# Patient Record
Sex: MALE | Race: WHITE | Employment: FULL TIME | URBAN - METROPOLITAN AREA
[De-identification: names, ages, dates, MRNs, and addresses within clinical notes are randomized per-mention and may not be internally consistent; named-entity substitution may affect disease eponyms.]

---

## 2019-06-18 RX ORDER — ESCITALOPRAM OXALATE 10 MG/1
10 TABLET ORAL DAILY
COMMUNITY

## 2019-06-18 RX ORDER — DEXTROAMPHETAMINE SACCHARATE, AMPHETAMINE ASPARTATE MONOHYDRATE, DEXTROAMPHETAMINE SULFATE AND AMPHETAMINE SULFATE 5; 5; 5; 5 MG/1; MG/1; MG/1; MG/1
20 CAPSULE, EXTENDED RELEASE ORAL
COMMUNITY

## 2019-06-18 NOTE — PERIOP NOTES
PAT - SURGICAL PRE-ADMISSION INSTRUCTIONS    NAME:  Mac Paiz                                                          TODAY'S DATE:  6/18/2019    SURGERY DATE:  6/20/2019                                  SURGERY ARRIVAL TIME:   0800    1. Do NOT eat or drink anything, including candy or gum, after MIDNIGHT on 06/19 , unless you have specific instructions from your Surgeon or Anesthesia Provider to do so. 2. No smoking on the day of surgery. 3. No alcohol 24 hours prior to the day of surgery. 4. No recreational drugs for one week prior to the day of surgery. 5. Leave all valuables, including money/purse, at home. 6. Remove all jewelry, nail polish, makeup (including mascara); no lotions, powders, deodorant, or perfume/cologne/after shave. 7. Glasses/Contact lenses and Dentures may be worn to the hospital.  They will be removed prior to surgery. 8. Call your doctor if symptoms of a cold or illness develop within 24 ours prior to surgery. 9. AN ADULT MUST DRIVE YOU HOME AFTER OUTPATIENT SURGERY. 10. If you are having an OUTPATIENT procedure, please make arrangements for a responsible adult to be with you for 24 hours after your surgery. 11. If you are admitted to the hospital, you will be assigned to a bed after surgery is complete. Normally a family member will not be able to see you until you are in your assigned bed. 15. Family is encouraged to accompany you to the hospital.  We ask visitors in the treatment area to be limited to ONE person at a time to ensure patient privacy. EXCEPTIONS WILL BE MADE AS NEEDED. 15. Children under 12 are discouraged from entering the treatment area and need to be supervised by an adult when in the waiting room. Special Instructions:    Complete bowel prep per MD instructions. Patient Prep:    shower with anti-bacterial soap    These surgical instructions were reviewed with Jordan during the PAT phone call. Directions:   On the morning of surgery, please go to the [address] [place] [address]. Enter the building from the [facility] entrance, 1st floor (next to the Emergency Room entrance). Take the elevator to the 2nd floor. Sign in at the Registration Desk.     If you have any questions and/or concerns, please do not hesitate to call:  (Prior to the day of surgery)  [facility] unit:  [phone]  (Day of surgery)  [facility] unit:  [phone]

## 2019-06-19 ENCOUNTER — ANESTHESIA EVENT (OUTPATIENT)
Dept: ENDOSCOPY | Age: 44
End: 2019-06-19
Payer: COMMERCIAL

## 2019-06-20 ENCOUNTER — HOSPITAL ENCOUNTER (OUTPATIENT)
Age: 44
Setting detail: OUTPATIENT SURGERY
Discharge: HOME | End: 2019-06-20
Attending: INTERNAL MEDICINE | Admitting: INTERNAL MEDICINE
Payer: COMMERCIAL

## 2019-06-20 ENCOUNTER — ANESTHESIA (OUTPATIENT)
Dept: ENDOSCOPY | Age: 44
End: 2019-06-20
Payer: COMMERCIAL

## 2019-06-20 VITALS
OXYGEN SATURATION: 99 % | WEIGHT: 193.25 LBS | BODY MASS INDEX: 24.8 KG/M2 | RESPIRATION RATE: 16 BRPM | HEART RATE: 56 BPM | DIASTOLIC BLOOD PRESSURE: 78 MMHG | TEMPERATURE: 98.2 F | HEIGHT: 74 IN | SYSTOLIC BLOOD PRESSURE: 130 MMHG

## 2019-06-20 PROCEDURE — 76040000007: Performed by: INTERNAL MEDICINE

## 2019-06-20 PROCEDURE — 77030007289 HC DEV ROTH NET RETRV STRC -B: Performed by: INTERNAL MEDICINE

## 2019-06-20 PROCEDURE — 77030034690 HC DEV ENDO SNR RETRV STRC -B: Performed by: INTERNAL MEDICINE

## 2019-06-20 PROCEDURE — 74011250636 HC RX REV CODE- 250/636: Performed by: INTERNAL MEDICINE

## 2019-06-20 PROCEDURE — 77030003657 HC NDL SCLER BSC -B: Performed by: INTERNAL MEDICINE

## 2019-06-20 PROCEDURE — 77030029384 HC SNR POLYP CAPTVR II BSC -B: Performed by: INTERNAL MEDICINE

## 2019-06-20 PROCEDURE — 77030011223 HC DEV LIG POLYLP OCOA -B: Performed by: INTERNAL MEDICINE

## 2019-06-20 PROCEDURE — 74011250636 HC RX REV CODE- 250/636: Performed by: NURSE ANESTHETIST, CERTIFIED REGISTERED

## 2019-06-20 PROCEDURE — 76060000032 HC ANESTHESIA 0.5 TO 1 HR: Performed by: INTERNAL MEDICINE

## 2019-06-20 PROCEDURE — 77030010936 HC CLP LIG BSC -C: Performed by: INTERNAL MEDICINE

## 2019-06-20 PROCEDURE — 74011250636 HC RX REV CODE- 250/636

## 2019-06-20 PROCEDURE — 77030020018 HC MRKR ENDOSC SPOT 5ML SYR GISP -B: Performed by: INTERNAL MEDICINE

## 2019-06-20 PROCEDURE — 88305 TISSUE EXAM BY PATHOLOGIST: CPT

## 2019-06-20 RX ORDER — SODIUM CHLORIDE 0.9 % (FLUSH) 0.9 %
5-40 SYRINGE (ML) INJECTION EVERY 8 HOURS
Status: DISCONTINUED | OUTPATIENT
Start: 2019-06-20 | End: 2019-06-20 | Stop reason: HOSPADM

## 2019-06-20 RX ORDER — LIDOCAINE HYDROCHLORIDE 20 MG/ML
INJECTION, SOLUTION EPIDURAL; INFILTRATION; INTRACAUDAL; PERINEURAL AS NEEDED
Status: DISCONTINUED | OUTPATIENT
Start: 2019-06-20 | End: 2019-06-20 | Stop reason: HOSPADM

## 2019-06-20 RX ORDER — EPINEPHRINE 0.1 MG/ML
INJECTION INTRACARDIAC; INTRAVENOUS AS NEEDED
Status: DISCONTINUED | OUTPATIENT
Start: 2019-06-20 | End: 2019-06-20 | Stop reason: HOSPADM

## 2019-06-20 RX ORDER — FAMOTIDINE 20 MG/1
20 TABLET, FILM COATED ORAL ONCE
Status: DISCONTINUED | OUTPATIENT
Start: 2019-06-20 | End: 2019-06-20 | Stop reason: HOSPADM

## 2019-06-20 RX ORDER — DEXTROMETHORPHAN/PSEUDOEPHED 2.5-7.5/.8
1.2 DROPS ORAL
Status: DISCONTINUED | OUTPATIENT
Start: 2019-06-20 | End: 2019-06-20 | Stop reason: HOSPADM

## 2019-06-20 RX ORDER — SODIUM CHLORIDE 0.9 % (FLUSH) 0.9 %
5-40 SYRINGE (ML) INJECTION AS NEEDED
Status: DISCONTINUED | OUTPATIENT
Start: 2019-06-20 | End: 2019-06-20 | Stop reason: HOSPADM

## 2019-06-20 RX ORDER — SODIUM CHLORIDE, SODIUM LACTATE, POTASSIUM CHLORIDE, CALCIUM CHLORIDE 600; 310; 30; 20 MG/100ML; MG/100ML; MG/100ML; MG/100ML
25 INJECTION, SOLUTION INTRAVENOUS CONTINUOUS
Status: DISCONTINUED | OUTPATIENT
Start: 2019-06-20 | End: 2019-06-20 | Stop reason: HOSPADM

## 2019-06-20 RX ORDER — PROPOFOL 10 MG/ML
INJECTION, EMULSION INTRAVENOUS AS NEEDED
Status: DISCONTINUED | OUTPATIENT
Start: 2019-06-20 | End: 2019-06-20 | Stop reason: HOSPADM

## 2019-06-20 RX ADMIN — PROPOFOL 50 MG: 10 INJECTION, EMULSION INTRAVENOUS at 09:41

## 2019-06-20 RX ADMIN — PROPOFOL 50 MG: 10 INJECTION, EMULSION INTRAVENOUS at 09:35

## 2019-06-20 RX ADMIN — PROPOFOL 100 MG: 10 INJECTION, EMULSION INTRAVENOUS at 09:53

## 2019-06-20 RX ADMIN — PROPOFOL 50 MG: 10 INJECTION, EMULSION INTRAVENOUS at 09:46

## 2019-06-20 RX ADMIN — SODIUM CHLORIDE, SODIUM LACTATE, POTASSIUM CHLORIDE, AND CALCIUM CHLORIDE 25 ML/HR: 600; 310; 30; 20 INJECTION, SOLUTION INTRAVENOUS at 09:15

## 2019-06-20 RX ADMIN — PROPOFOL 50 MG: 10 INJECTION, EMULSION INTRAVENOUS at 09:31

## 2019-06-20 RX ADMIN — PROPOFOL 50 MG: 10 INJECTION, EMULSION INTRAVENOUS at 09:37

## 2019-06-20 RX ADMIN — PROPOFOL 50 MG: 10 INJECTION, EMULSION INTRAVENOUS at 09:39

## 2019-06-20 RX ADMIN — PROPOFOL 100 MG: 10 INJECTION, EMULSION INTRAVENOUS at 09:29

## 2019-06-20 RX ADMIN — PROPOFOL 50 MG: 10 INJECTION, EMULSION INTRAVENOUS at 09:43

## 2019-06-20 RX ADMIN — PROPOFOL 50 MG: 10 INJECTION, EMULSION INTRAVENOUS at 09:32

## 2019-06-20 RX ADMIN — LIDOCAINE HYDROCHLORIDE 40 MG: 20 INJECTION, SOLUTION EPIDURAL; INFILTRATION; INTRACAUDAL; PERINEURAL at 09:29

## 2019-06-20 NOTE — PERIOP NOTES
Pre-Op Summary    Pt arrived via car with family/friend and is oriented to time, place, person and situation. Patient with steady gait with none assistive devices. Visit Vitals  /79   Pulse 60   Temp 98.2 °F (36.8 °C)   Resp 16   Ht 6' 2\" (1.88 m)   Wt 87.7 kg (193 lb 4 oz)   SpO2 98%   BMI 24.81 kg/m²       Peripheral IV located on Right hand . Patients belongings are located with wife. Patient's point of contact is Emma Greco, wife and their contact number is: NA. They will be in the waiting room. They are able to receive medication information. They will be their ride home.

## 2019-06-20 NOTE — ROUTINE PROCESS
Patient taken to recovery by this RN and CRNA, bedside report given to [facility] RN. Patient stable and on monitor.

## 2019-06-20 NOTE — PERIOP NOTES
Phase 2 Recovery Summary  Patient arrived to Phase 2 at 1004  Report received from Carlos Chase:    06/18/19 1219 06/20/19 0856 06/20/19 1005   BP:  126/79 130/78   Pulse:  60 (!) 56   Resp:  16 16   Temp:  98.2 °F (36.8 °C)    SpO2:  98% 99%   Weight: 90.7 kg (200 lb) 87.7 kg (193 lb 4 oz)    Height: 6' 2\" (1.88 m)         oriented to time, place, person and situation    Lines and Drains  Peripheral Intravenous Line:  Removed       Patient arrived to phase 2 from [facility]. Alert and oriented x4. No complaints of pain, nausea or dizziness. Vital signs taken. Patient ambulated from bed to chair with minimal assistance. family brought back to recovery room. Dr. Azael Forbes came and spoke with patient and family. Tolerated fluids well. IV removed and patient allowed to get dressed. Reviewed discharge instructions. Wife signed for discharge. Patient transported to vehicle via wheelchair.        Patient discharged to home with Wife, Raye Ormond  at [place] [address]

## 2019-06-20 NOTE — PROCEDURES
Colonoscopy Report    Patient: Irma Miranda MRN: [id number]  SSN: [id number]    YOB: 1975  Age: 53 y.o. Sex: male      Date of Procedure: 6/20/2019    IMPRESSION:  1. Large 3-4 cm pedunculated polyp observed in the sigmoid colon. Friable. Status post injected with 2 cc of epinephrine to the pedicle. Proceeded to hot snare the polyp piecemeal down to the pedicle. No bleeding observed. An endoclip was applied to the mucosal defect. Tattoo was applied to the base. Net was used to extract the polyp. RECOMMENDATIONS:  1. Resume regular diet, Recommend high fiber. 2. Repeat Flexible sigmoidoscopy in 3 months to ensure no recurrence of polyp. Indication:  Large Colon Polyp  Procedure Performed: Colonoscopy polypectomy (snare cautery)  Endoscopist: Narda Gonzalez MD  Assistant: Endoscopy Technician-1: Franny Live  Endoscopy RN-1: Erma Hammond RN  Endoscopy RN-2: Dakota Campos RN  ASA: ASA 2 - Patient with mild systemic disease with no functional limitations  Mallampati Score: I (soft palate, uvula, fauces, tonsillar pillars visible)  Anesthesia: MAC anesthesia Propofol  Endoscope:     []  CF H 190AL   []  PCF H190AL   [x]  GIF H 190    Extent of Examination:sigmoid colon  Quality of Preparation:     [x]  Excellent   []  Very Good   [] Fair but adequate   [] Fair, inadequate   []  Poor      Technique: The procedure was discussed with the patient including risks, benefits, alternatives including risks of IV sedation, bleeding, perforation and missed polyp. A safety timeout was performed. The patient was given incremental doses of intravenous sedation to achieve moderate sedation. The patients vital signs were monitored at all times including heart rate, rhythm, blood pressure and oxygen saturation. The patient was placed in left lateral position. When adequate sedation was achieved a perianal inspection and a digital rectal exam were performed.  Video colonoscope was introduced into the rectum and advanced under direct vision up to the sigmoid colon. The cecum was identified by IC valve, appendiceal orifice and crows foot. With adequate insufflation and maneuvering of the withdrawing scope, the colonic mucosa was visualized carefully. Retroflexion was performed in the rectum and the distal rectum visualized. The patient tolerated the procedure very well and was transferred to recovery area. Findings:  1. Large 3-4 cm pedunculated polyp observed in the sigmoid colon. Friable. Status post injected with 2 cc of epinephrine to the pedicle. Proceeded to hot snare the polyp piecemeal down to the pedicle. No bleeding observed. An endoclip was applied to the mucosal defect. Tattoo was applied to the base. Net was used to extract the polyp.       EBL:Minimal  Specimen:   ID Type Source Tests Collected by Time Destination   1 : hot snare polyp  Preservative Sigmoid  Orval MD Brett 6/20/2019 [id number] Pathology       Winter Persaud MD  June 20, 2019  10:06 AM

## 2019-06-20 NOTE — ANESTHESIA PREPROCEDURE EVALUATION
Relevant Problems   No relevant active problems       Anesthetic History   No history of anesthetic complications            Review of Systems / Medical History  Patient summary reviewed and pertinent labs reviewed    Pulmonary  Within defined limits                 Neuro/Psych   Within defined limits           Cardiovascular  Within defined limits                Exercise tolerance: >4 METS     GI/Hepatic/Renal  Within defined limits              Endo/Other  Within defined limits           Other Findings   Comments:                  Physical Exam    Airway  Mallampati: II  TM Distance: 4 - 6 cm  Neck ROM: normal range of motion   Mouth opening: Normal     Cardiovascular  Regular rate and rhythm,  S1 and S2 normal,  no murmur, click, rub, or gallop  Rhythm: regular  Rate: normal         Dental  No notable dental hx       Pulmonary  Breath sounds clear to auscultation               Abdominal  GI exam deferred       Other Findings            Anesthetic Plan    ASA: 1  Anesthesia type: MAC          Induction: Intravenous  Anesthetic plan and risks discussed with: Patient

## 2019-06-20 NOTE — ANESTHESIA POSTPROCEDURE EVALUATION
Procedure(s):  SIGMOIDOSCOPY FLEXIBLE.     MAC    Anesthesia Post Evaluation      Multimodal analgesia: multimodal analgesia not used between 6 hours prior to anesthesia start to PACU discharge  Patient location during evaluation: bedside  Patient participation: complete - patient participated  Level of consciousness: awake and alert  Pain management: satisfactory to patient  Airway patency: patent  Anesthetic complications: no  Cardiovascular status: acceptable  Respiratory status: acceptable  Hydration status: acceptable  Post anesthesia nausea and vomiting:  none      Vitals Value Taken Time   /78 6/20/2019 10:05 AM   Temp     Pulse 56 6/20/2019 10:05 AM   Resp 16 6/20/2019 10:05 AM   SpO2 99 % 6/20/2019 10:05 AM

## 2019-06-20 NOTE — DISCHARGE INSTRUCTIONS
Patient Education        Sigmoidoscopy: What to Expect at [facility]    A sigmoidoscopy lets your doctor look inside the lower part of your large intestine. This is also called the colon. The doctor uses a lighted tube called a sigmoidoscope (or scope). This test let the doctor look for small growths (called polyps), cancer, bleeding, hemorrhoids, or other problems. The doctor also may have used the scope to remove polyps. Or he or she may have used it to take tissue samples that need to be tested. You shouldn't have any pain after the procedure. But it is normal to pass gas. You may have mild discomfort from having gas. If your doctor removed polyps, you will likely need to schedule a colonoscopy to look at the whole colon. This care sheet gives you a general idea about how long it will take for you to recover. But each person recovers at a different pace. Follow the steps below to get better as quickly as possible. How can you care for yourself at home? Activity    · Most people are able to return to work right away unless they have had a sedative during the procedure.     · You may need someone to drive you home if you have had a sedative. In most cases, you can drive yourself home. Diet    · You can eat your normal diet. If your stomach is upset, try bland, low-fat foods like plain rice, broiled chicken, toast, and yogurt.     · Be sure to drink plenty of liquids to replace those you have lost during the preparation for the procedure. Exercise    · You can return to normal exercise right away.    Medicine    · Your doctor will tell you if and when you can restart your medicines. He or she will also give you instructions about taking any new medicines.     · If you take blood thinners, such as warfarin (Coumadin), clopidogrel (Plavix), or aspirin, be sure to talk to your doctor. He or she will tell you if and when to start taking those medicines again.  Make sure that you understand exactly what your doctor wants you to do. Follow-up care is a key part of your treatment and safety. Be sure to make and go to all appointments, and call your doctor if you are having problems. It's also a good idea to know your test results and keep a list of the medicines you take. When should you call for help? Call your doctor now or seek immediate medical care if:    · You have new or worse belly pain.     · You have blood in your stools.    Watch closely for changes in your health, and be sure to contact your doctor if you have any problems. Where can you learn more? Go to http://sohail-benjamin.info/. Enter G550 in the search box to learn more about \"Sigmoidoscopy: What to Expect at Home. \"  Current as of: March 27, 2018  Content Version: 11.9  © 9932-1609 [employer]. Care instructions adapted under license by [employer] (which disclaims liability or warranty for this information). If you have questions about a medical condition or this instruction, always ask your healthcare professional. [place] [address] any warranty or liability for your use of this information. DISCHARGE SUMMARY from Nurse    PATIENT INSTRUCTIONS:    After general anesthesia or intravenous sedation, for 24 hours or while taking prescription Narcotics:  · Limit your activities  · Do not drive and operate hazardous machinery  · Do not make important personal or business decisions  · Do  not drink alcoholic beverages  · If you have not urinated within 8 hours after discharge, please contact your surgeon on call.     Report the following to your surgeon:  · Excessive pain, swelling, redness or odor of or around the surgical area  · Temperature over 100.5  · Nausea and vomiting lasting longer than 4 hours or if unable to take medications  · Any signs of decreased circulation or nerve impairment to extremity: change in color, persistent  numbness, tingling, coldness or increase pain  · Any questions    What to do at Home:  Recommended activity: Activity as tolerated and no driving for today      These are general instructions for a healthy lifestyle:    No smoking/ No tobacco products/ Avoid exposure to second hand smoke  Surgeon General's Warning:  Quitting smoking now greatly reduces serious risk to your health. Obesity, smoking, and sedentary lifestyle greatly increases your risk for illness    A healthy diet, regular physical exercise & weight monitoring are important for maintaining a healthy lifestyle    You may be retaining fluid if you have a history of heart failure or if you experience any of the following symptoms:  Weight gain of 3 pounds or more overnight or 5 pounds in a week, increased swelling in our hands or feet or shortness of breath while lying flat in bed. Please call your doctor as soon as you notice any of these symptoms; do not wait until your next office visit. The discharge information has been reviewed with the patient. The patient verbalized understanding. Discharge medications reviewed with the patient and appropriate educational materials and side effects teaching were provided. Patient armband removed and given to patient to take home. Patient was informed of the privacy risks if armband lost or stolen.

## 2019-06-20 NOTE — H&P
Gastroenterology Consult     Referring Physician: Stella Ramirez Date: 6/20/2019     Subjective:     Chief Complaint: large colon polyp    History of Present Illness: Lien Dunlap is a 53 y.o. male who is seen in consultation for large sigmoid colon polyp that was not able to be removed during outpatient setting colonoscopy. History reviewed. No pertinent past medical history. Past Surgical History:   Procedure Laterality Date    HX ANKLE FRACTURE TX Right     HX ORTHOPAEDIC Right     Pin in pinky finger then removed      History reviewed. No pertinent family history. Social History     Tobacco Use    Smoking status: Never Smoker    Smokeless tobacco: Never Used   Substance Use Topics    Alcohol use: Yes     Alcohol/week: 1.2 oz     Types: 1 Shots of liquor, 1 Cans of beer per week      No Known Allergies  Current Facility-Administered Medications   Medication Dose Route Frequency    lactated Ringers infusion  25 mL/hr IntraVENous CONTINUOUS    famotidine (PEPCID) tablet 20 mg  20 mg Oral ONCE        Review of Systems:  A detailed 10 organ review of systems is obtained with pertinent positives as listed in the History of Present Illness and Past Medical History. All others are negative. Objective:     Physical Exam:  Visit Vitals  /79   Pulse 60   Temp 98.2 °F (36.8 °C)   Resp 16   Ht 6' 2\" (1.88 m)   Wt 87.7 kg (193 lb 4 oz)   SpO2 98%   BMI 24.81 kg/m²        Skin:  Extremities and face reveal no rashes. No garsia erythema. No telangiectasias on the chest wall. HEENT: Sclerae anicteric. Extra-occular muscles are intact. No oral ulcers. No abnormal pigmentation of the lips. The neck is supple. Cardiovascular: Regular rate and rhythm. No murmurs, gallops, or rubs. PMI nondisplaced. Carotids without bruits. Respiratory:  Comfortable breathing with no accessory muscle use. Clear breath sounds with no wheezes, rales, or rhonchi. GI:  Abdomen nondistended, soft, and nontender.   Normal active bowel sounds. No enlargement of the liver or spleen. No masses palpable. Rectal:  Deferred  Musculoskeletal:  No pitting edema of the lower legs. Extremities have good range of motion. No costovertebral tenderness. Neurological:  Gross memory appears intact. Patient is alert and oriented. Psychiatric:  Mood appears appropriate with judgement intact. Lymphatic:  No cervical or supraclavicular adenopathy. Assessment/Plan:     Flexible sigmoidoscopy as planned. Risks and benefits reviewed with patient.

## (undated) DEVICE — SNARE ENDOSCP XL W33MMXL240CM SHTH DIA2.4MM COLON STIFF

## (undated) DEVICE — CONTAINER PREFIL FRMLN 15ML --

## (undated) DEVICE — REM POLYHESIVE ADULT PATIENT RETURN ELECTRODE: Brand: VALLEYLAB

## (undated) DEVICE — NDL INJ SCLERO 25G 240CM -- INTERJECT M00518360 BX/5

## (undated) DEVICE — Device: Brand: SPOT EX ENDOSCOPIC TATTOO

## (undated) DEVICE — WORKING LENGTH 235CM, WORKING CHANNEL 2.8MM: Brand: RESOLUTION 360 CLIP

## (undated) DEVICE — NET RETRV FB ENDOSCP 2.5MMX230 -- ROTH NET

## (undated) DEVICE — SINGLE USE LIGATING DEVICE: Brand: SINGLE USE LIGATING DEVICE

## (undated) DEVICE — KIT COLON W/ 1.1OZ LUB AND 2 END

## (undated) DEVICE — TRAP SPEC COLL POLYP POLYSTYR --

## (undated) DEVICE — SNARE POLYP OVAL TIP 30X55MM -- LARIAT